# Patient Record
Sex: MALE | Race: BLACK OR AFRICAN AMERICAN | Employment: FULL TIME | URBAN - METROPOLITAN AREA
[De-identification: names, ages, dates, MRNs, and addresses within clinical notes are randomized per-mention and may not be internally consistent; named-entity substitution may affect disease eponyms.]

---

## 2020-02-21 ENCOUNTER — OFFICE VISIT (OUTPATIENT)
Dept: FAMILY MEDICINE | Facility: CLINIC | Age: 55
End: 2020-02-21
Payer: COMMERCIAL

## 2020-02-21 VITALS
DIASTOLIC BLOOD PRESSURE: 84 MMHG | TEMPERATURE: 98.1 F | SYSTOLIC BLOOD PRESSURE: 115 MMHG | BODY MASS INDEX: 25.16 KG/M2 | HEART RATE: 78 BPM | WEIGHT: 151 LBS | HEIGHT: 65 IN

## 2020-02-21 DIAGNOSIS — M79.632 PAIN OF LEFT FOREARM: ICD-10-CM

## 2020-02-21 DIAGNOSIS — M79.2 NERVE PAIN: Primary | ICD-10-CM

## 2020-02-21 PROBLEM — M79.642 LEFT HAND PAIN: Status: ACTIVE | Noted: 2019-08-16

## 2020-02-21 PROBLEM — M50.90 CERVICAL DISC DISEASE: Status: ACTIVE | Noted: 2019-08-16

## 2020-02-21 PROBLEM — J30.9 CHRONIC ALLERGIC RHINITIS: Status: ACTIVE | Noted: 2018-08-15

## 2020-02-21 PROBLEM — M72.2 PLANTAR FASCIITIS: Status: ACTIVE | Noted: 2018-07-24

## 2020-02-21 PROCEDURE — 99203 OFFICE O/P NEW LOW 30 MIN: CPT | Performed by: PHYSICIAN ASSISTANT

## 2020-02-21 RX ORDER — MULTIPLE VITAMINS W/ MINERALS TAB 9MG-400MCG
TAB ORAL
COMMUNITY
Start: 2018-05-04

## 2020-02-21 RX ORDER — IPRATROPIUM BROMIDE 21 UG/1
2 SPRAY, METERED NASAL EVERY 12 HOURS
COMMUNITY
Start: 2018-08-15

## 2020-02-21 RX ORDER — GABAPENTIN 100 MG/1
100 CAPSULE ORAL
COMMUNITY
Start: 2019-11-08 | End: 2020-11-07

## 2020-02-21 RX ORDER — FLUCONAZOLE 200 MG/1
400 TABLET ORAL
COMMUNITY

## 2020-02-21 RX ORDER — OXYCODONE HCL 10 MG/1
10 TABLET, FILM COATED, EXTENDED RELEASE ORAL
COMMUNITY
Start: 2012-04-10

## 2020-02-21 RX ORDER — ATOVAQUONE AND PROGUANIL HYDROCHLORIDE 250; 100 MG/1; MG/1
1 TABLET, FILM COATED ORAL
COMMUNITY
Start: 2018-09-05

## 2020-02-21 RX ORDER — ACETAMINOPHEN 325 MG/1
487.5 TABLET ORAL
COMMUNITY
Start: 2012-04-10

## 2020-02-21 RX ORDER — AMOXICILLIN 250 MG
1 CAPSULE ORAL
COMMUNITY
Start: 2012-04-10

## 2020-02-21 RX ORDER — FLUTICASONE PROPIONATE 50 MCG
2 SPRAY, SUSPENSION (ML) NASAL
COMMUNITY
Start: 2019-10-02

## 2020-02-21 RX ORDER — EMTRICITABINE AND TENOFOVIR DISOPROXIL FUMARATE 200; 300 MG/1; MG/1
1 TABLET, FILM COATED ORAL
COMMUNITY

## 2020-02-21 SDOH — HEALTH STABILITY: MENTAL HEALTH: HOW OFTEN DO YOU HAVE A DRINK CONTAINING ALCOHOL?: NEVER

## 2020-02-21 ASSESSMENT — MIFFLIN-ST. JEOR: SCORE: 1451.81

## 2020-02-21 NOTE — PROGRESS NOTES
SUBJECTIVE:   Charbel Mckeon is a 55 year old male presenting with a chief complaint of   Chief Complaint   Patient presents with     Arm Pain     LT side also having swollen finger on LT hand     Patient has a history of left forearm pain that seems to start in his 1st, 2nd, and 3rd digits and radiate up to his elbow. He describes it as electrical shooting pains that occasionally give him numbness and tingling. This gets worse at night and often prevent him from sleeping. Past providers suspected cervical radiculopathy and carpal tunnel. Patient has been managing his pain with NSAIDs and a carpal tunnel night brace which provides him with mild relief.      Patient denies neck pain and states that the pain does not extend all the way to his neck or shoulders however it occasionally goes up as far as his bicep. He denies weakness in the arm.     He is a new patient of [facility].    Review of Systems  Review Of Systems    Eyes: negative  Ears/Nose/Throat: negative  Respiratory: No shortness of breath, dyspnea on exertion, cough, or hemoptysis  Cardiovascular: negative  Gastrointestinal: negative  Genitourinary: negative  Musculoskeletal: See HPI  Neurologic: See HPI  Psychiatric: negative  Hematologic/Lymphatic/Immunologic: negative  Endocrine: negative    No past medical history on file.  No family history on file.  Current Outpatient Medications   Medication Sig Dispense Refill     acetaminophen 325 MG PO tablet Take 487.5 mg by mouth       atovaquone-proguanil 250-100 MG PO tablet Take 1 tablet by mouth       fluticasone 50 MCG/ACT NA nasal spray 2 sprays       gabapentin 100 MG PO capsule Take 100 mg by mouth       GENVOYA [phone] mg tablet TAKE ONE TABLET BY MOUTH EVERY DAY       ipratropium 0.03 % NA nasal spray 2 sprays every 12 hours       multivitamin w/minerals (CERTAVITE/ANTIOXIDANTS) PO tablet TAKE ONE TABLET BY MOUTH EVERY DAY       oxyCODONE 10 MG PO 12 hr tablet Take 10 mg by mouth        "senna-docusate 8.6-50 MG PO tablet Take 1 tablet by mouth       darunavir 600 MG PO TABS tablet Take 600 mg by mouth       emtricitabine-tenofovir 200-300 MG PO per tablet Take 1 tablet by mouth       fluconazole 200 MG PO tablet Take 400 mg by mouth       raltegravir 400 MG PO tablet Take 400 mg by mouth       ritonavir 100 MG PO capsule Take 100 mg by mouth       Social History     Tobacco Use     Smoking status: Never Smoker     Smokeless tobacco: Never Used   Substance Use Topics     Alcohol use: Never     Frequency: Never       OBJECTIVE  /84   Pulse 78   Temp 98.1  F (36.7  C) (Tympanic)   Ht 1.651 m (5' 5\")   Wt 68.5 kg (151 lb)   BMI 25.13 kg/m      Physical Exam    Exam:  Constitutional: healthy, alert and no distress  Head: Normocephalic. No masses, lesions, tenderness or abnormalities  Neck: Neck supple. No adenopathy. Thyroid symmetric, normal size, Carotids without bruits.  ENT: ENT exam normal, no neck nodes or sinus tenderness  Cardiovascular: negative, PMI normal. No lifts, heaves, or thrills. RRR. No murmurs, clicks gallops or rub  Respiratory: negative, Percussion normal. Good diaphragmatic excursion. Lungs clear  Gastrointestinal: Abdomen soft, non-tender. BS normal. No masses, organomegaly  : Deferred  Musculoskeletal: extremities normal- no gross deformities noted, gait normal and normal muscle tone  Skin: no suspicious lesions or rashes  Neurologic: Gait normal. Reflexes normal and symmetric. Sensation grossly WNL.  Psychiatric: mentation appears normal and affect normal/bright  Hematologic/Lymphatic/Immunologic: Normal cervical lymph nodes      ASSESSMENT/PLAN:    (M79.2) Nerve pain  (primary encounter diagnosis)  Plan: NEUROLOGY ADULT REFERRAL    (M79.632) Pain of left forearm  Plan: NEUROLOGY ADULT REFERRAL    I would like the patient to continue using his carpal tunnel brace as well as icing multiple times a day.     Okay to take acetaminophen 500 mg- 2 tabs (Total of 1000 mg) " every 8 hrs   Okay to take ibuprofen 200 mg- 3 tabs (Total of 600 mg) every 6 hours       Patient will have EMG of the left arm done in order to get a better idea of the ideology. Patient will follow up with PCP to discuss results. Consider steroid injections or gabapentin.     Tomas Aguayo PA-C on 2/21/2020 at 3:35 PM

## 2020-03-02 ENCOUNTER — TELEPHONE (OUTPATIENT)
Dept: FAMILY MEDICINE | Facility: CLINIC | Age: 55
End: 2020-03-02

## 2020-03-02 DIAGNOSIS — M79.632 PAIN OF LEFT FOREARM: Primary | ICD-10-CM

## 2020-03-02 NOTE — TELEPHONE ENCOUNTER
Forwarding this message to alternate provider.  Patient was seen by a same day provider; Tomas Aguayo PA-C 2/21/20.  Has not seen a primary care provider in [facility].   Neurology referral for an EMG ordered incorrectly; needs order placed for the EMG procedure as well:  [facility] Order[facility].  Visit type:  EMG  I called [facility] scheduling and was told to place the order as NEU5.      Order has been pended for signature.  Ludy Cummings RN      ASSESSMENT/PLAN:     (M79.2) Nerve pain  (primary encounter diagnosis)  Plan: NEUROLOGY ADULT REFERRAL     (M79.632) Pain of left forearm  Plan: NEUROLOGY ADULT REFERRAL     I would like the patient to continue using his carpal tunnel brace as well as icing multiple times a day.      Okay to take acetaminophen 500 mg- 2 tabs (Total of 1000 mg) every 8 hrs   Okay to take ibuprofen 200 mg- 3 tabs (Total of 600 mg) every 6 hours        Patient will have EMG of the left arm done in order to get a better idea of the ideology. Patient will follow up with PCP to discuss results. Consider steroid injections or gabapentin.      Tomas Aguayo PA-C on 2/21/2020 at 3:35 PM

## 2020-03-02 NOTE — TELEPHONE ENCOUNTER
----- Message from Jen Surjit sent at 3/2/2020 11:01 AM CST -----  Regarding: EMG Procedure Order  Jim,    Tomas Barrett PA-C placed a neurology order for this patient to have an EMG. Could you please place an EMG Procedure Order as well? When entering the order it can be found under:    Proc category: [facility] Orderables  Visit type: EMG    Once placed, the order will read EMG and not Neurology Adult Referral.      Thank you kindly,  Jen  Patient Access - [facility]